# Patient Record
Sex: FEMALE | Race: WHITE | NOT HISPANIC OR LATINO | Employment: UNEMPLOYED | URBAN - METROPOLITAN AREA
[De-identification: names, ages, dates, MRNs, and addresses within clinical notes are randomized per-mention and may not be internally consistent; named-entity substitution may affect disease eponyms.]

---

## 2018-06-14 ENCOUNTER — TREATMENT (OUTPATIENT)
Dept: PHYSICAL THERAPY | Facility: CLINIC | Age: 38
End: 2018-06-14

## 2018-06-14 DIAGNOSIS — M54.5 CHRONIC LOW BACK PAIN, UNSPECIFIED BACK PAIN LATERALITY, WITH SCIATICA PRESENCE UNSPECIFIED: ICD-10-CM

## 2018-06-14 DIAGNOSIS — G89.29 CHRONIC LOW BACK PAIN, UNSPECIFIED BACK PAIN LATERALITY, WITH SCIATICA PRESENCE UNSPECIFIED: ICD-10-CM

## 2018-06-14 DIAGNOSIS — M46.1 SACROILIITIS (HCC): Primary | ICD-10-CM

## 2018-06-14 PROCEDURE — 97161 PT EVAL LOW COMPLEX 20 MIN: CPT | Performed by: PHYSICAL THERAPIST

## 2018-06-14 NOTE — PROGRESS NOTES
Physical Therapy Initial Evaluation and Plan of Care    Patient: Lana Wisdom   : 1980  Diagnosis/ICD-10 Code:  Sacroiliitis [M46.1]  Referring practitioner: No ref. provider found  Date of Initial Visit: 2018  Today's Date: 2018  Patient seen for 1 sessions             Subjective Evaluation    History of Present Illness  Mechanism of injury: Pt states that she has some Si joint dysfunction on the right side that causes spasm in the QL's. She has pain with running, sitting, standing, and she has difficulty with sleeping. She had PRP injections about 2 months ago and she feels like that that helped to resolve her arthritis and decrease some of her symptoms but she still has pain with activity and she feels like she still has pain because of the hypermobility. She also feels like she has some spasm in the psoas muscle and along the transverse abdominus as well. Some pain in the outside and front of the hip. She denies having any pain into the leg. Pt had therapy about 4 years ago but she didn't see much benefit.     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 She feels like the issue with her SI joint may be related to the mortons toe.    Pt works out frequently and is very active. She is running about 9-12 miles per week.        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  Quality of life: good    Pain  Current pain ratin  At best pain ratin  At worst pain rating: 10  Location: SI joint with muscle spasms in the QL's and other associated musculature.   Quality: dull ache and sharp  Alleviating factors: SI belt.  Exacerbated by: running.  Progression: worsening (worsening overall but better since the PRP injections)    Treatments  No previous or current treatments  Previous treatment: chiropractic, injection treatment and medication (PRP injections)  Patient Goals  Patient goals  for therapy: decreased pain, increased strength and return to sport/leisure activities             Objective       Palpation     Additional Palpation Details  TTP along lower lumbar segemtns, right SI joint, right QL, right piriformis, an right glut med.     Hypermobility noted in the lumbar spine an SI joints. Generalized laxity noted as well.     Active Range of Motion     Lumbar   Normal active range of motion    Strength/Myotome Testing     Left Hip   Planes of Motion   Extension: 4  Abduction: 4    Right Hip   Planes of Motion   Extension: 4  Abduction: 4    Tests     Lumbar     Right   Negative passive SLR and quadrant.     Additional Tests Details  Supine leg length test + for post rotation of right innominate          Assessment & Plan     Assessment  Impairments: abnormal muscle tone, abnormal or restricted ROM, activity intolerance, impaired physical strength, lacks appropriate home exercise program and pain with function  Assessment details: Patient is a 37 year old female who comes to physical therapy with c/o chronic SI jt pain and muscle spasms. Signs and symptoms are consistent with global ligamentous laxity with SI joint dysfunction and associated muscle guarding resulting in pain, decreased ROM, decreased strength, and inability to perform all essential functional activities. Pt will benefit from skilled PT services to address the above issues.     Prognosis: good  Prognosis details:   SHORT TERM GOALS:     2 weeks  1. Pt independent with HEP  2. Pt to report being able to run 2 miles without pain  3. Pt to demonstrate bilateral hip strength 4/5 in all planes to improved stability of the core/trunk     LONG TERM GOALS:   6 weeks  1. Pt to report being able to do all workout activities without exacerbation of symptoms  2. Pt to demonstrate ability to perform full functional squat with good form and without increased pain in the low back   3. Pt to report being able to drive up to 2 hours without pain  in the low back     Functional Limitations: carrying objects, lifting, pulling, pushing, uncomfortable because of pain, sitting and unable to perform repetitive tasks  Plan  Therapy options: will be seen for skilled physical therapy services  Planned modality interventions: cryotherapy, high voltage pulsed current (pain management), iontophoresis, microcurrent electrical stimulation, TENS, thermotherapy (hydrocollator packs), ultrasound and traction  Planned therapy interventions: ADL retraining, body mechanics training, flexibility, functional ROM exercises, home exercise program, IADL retraining, joint mobilization, manual therapy, motor coordination training, neuromuscular re-education, postural training, soft tissue mobilization, spinal/joint mobilization, strengthening, stretching and abdominal trunk stabilization  Frequency: 2x week  Duration in weeks: 6  Treatment plan discussed with: patient        Evaluation Only     PT SIGNATURE: Beck Evans PT   DATE TREATMENT INITIATED: 6/14/2018    Initial Certification  Certification Period: 9/12/2018  I certify that the therapy services are furnished while this patient is under my care.  The services outlined above are required by this patient, and will be reviewed every 90 days.     PHYSICIAN:       DATE:     Please sign and return via fax to [phone].. Thank you, [facility].

## 2018-06-18 ENCOUNTER — TRANSCRIBE ORDERS (OUTPATIENT)
Dept: PHYSICAL THERAPY | Facility: CLINIC | Age: 38
End: 2018-06-18

## 2018-06-18 DIAGNOSIS — M53.3 SACROILIAC JOINT PAIN: Primary | ICD-10-CM

## 2018-06-25 ENCOUNTER — TREATMENT (OUTPATIENT)
Dept: PHYSICAL THERAPY | Facility: CLINIC | Age: 38
End: 2018-06-25

## 2018-06-25 DIAGNOSIS — M46.1 SACROILIITIS (HCC): Primary | ICD-10-CM

## 2018-06-25 DIAGNOSIS — M54.5 CHRONIC LOW BACK PAIN, UNSPECIFIED BACK PAIN LATERALITY, WITH SCIATICA PRESENCE UNSPECIFIED: ICD-10-CM

## 2018-06-25 DIAGNOSIS — G89.29 CHRONIC LOW BACK PAIN, UNSPECIFIED BACK PAIN LATERALITY, WITH SCIATICA PRESENCE UNSPECIFIED: ICD-10-CM

## 2018-06-25 PROCEDURE — 97110 THERAPEUTIC EXERCISES: CPT | Performed by: PHYSICAL THERAPIST

## 2018-06-25 PROCEDURE — 97140 MANUAL THERAPY 1/> REGIONS: CPT | Performed by: PHYSICAL THERAPIST

## 2018-06-27 NOTE — PROGRESS NOTES
Physical Therapy Daily Progress Note    Subjective   Lana Wisdom reports that she is feeling about the same, but she has some relief from pain with activation of the TA     Objective   See Exercise, Manual, and Modality Logs for complete treatment.       Assessment/Plan     Dry needling performed for the right QL. Pt seemed to respond well to treatment today and she was able to stretch the QL during treatment and get mild relief of symptoms.     Progress per Plan of Care and Progress strengthening /stabilization /functional activity           Manual Therapy:    32     mins  34981;  Therapeutic Exercise:    28     mins  28223;     Neuromuscular Stacey:        mins  85158;    Therapeutic Activity:          mins  37333;     Gait Training:           mins  83250;     Ultrasound:          mins  50939;    Electrical Stimulation:         mins  04449 ( );  Dry Needling          mins self-pay    Timed Treatment:   60   mins   Total Treatment:     75   mins    Beck Evans PT  Physical Therapist

## 2018-06-29 ENCOUNTER — TREATMENT (OUTPATIENT)
Dept: PHYSICAL THERAPY | Facility: CLINIC | Age: 38
End: 2018-06-29

## 2018-06-29 DIAGNOSIS — M54.5 CHRONIC LOW BACK PAIN, UNSPECIFIED BACK PAIN LATERALITY, WITH SCIATICA PRESENCE UNSPECIFIED: ICD-10-CM

## 2018-06-29 DIAGNOSIS — G89.29 CHRONIC LOW BACK PAIN, UNSPECIFIED BACK PAIN LATERALITY, WITH SCIATICA PRESENCE UNSPECIFIED: ICD-10-CM

## 2018-06-29 DIAGNOSIS — M46.1 SACROILIITIS (HCC): Primary | ICD-10-CM

## 2018-06-29 PROCEDURE — 97110 THERAPEUTIC EXERCISES: CPT | Performed by: PHYSICAL THERAPIST

## 2018-06-29 PROCEDURE — 97140 MANUAL THERAPY 1/> REGIONS: CPT | Performed by: PHYSICAL THERAPIST

## 2018-06-30 NOTE — PROGRESS NOTES
Physical Therapy Daily Progress Note    Subjective   Lana Wisdom reports that she has been feeling much better since the prevous treatment. She did not have any spasm in the right QL this week.     Objective   See Exercise, Manual, and Modality Logs for complete treatment.       Assessment/Plan     Pt is making good progress with therapy and she is reposnding well to dry needling treatment. Will continue to progress as indicated.     Progress per Plan of Care and Progress strengthening /stabilization /functional activity           Manual Therapy:    32     mins  63145;  Therapeutic Exercise:    15     mins  99569;     Neuromuscular Stacey:        mins  93047;    Therapeutic Activity:          mins  52189;     Gait Training:           mins  44072;     Ultrasound:          mins  58021;    Electrical Stimulation:         mins  03041 ( );  Dry Needling          mins self-pay    Timed Treatment:   47   mins   Total Treatment:     60   mins    Beck Evans PT  Physical Therapist

## 2018-07-11 ENCOUNTER — TREATMENT (OUTPATIENT)
Dept: PHYSICAL THERAPY | Facility: CLINIC | Age: 38
End: 2018-07-11

## 2018-07-11 DIAGNOSIS — M54.5 CHRONIC LOW BACK PAIN, UNSPECIFIED BACK PAIN LATERALITY, WITH SCIATICA PRESENCE UNSPECIFIED: ICD-10-CM

## 2018-07-11 DIAGNOSIS — M46.1 SACROILIITIS (HCC): Primary | ICD-10-CM

## 2018-07-11 DIAGNOSIS — G89.29 CHRONIC LOW BACK PAIN, UNSPECIFIED BACK PAIN LATERALITY, WITH SCIATICA PRESENCE UNSPECIFIED: ICD-10-CM

## 2018-07-11 PROCEDURE — 97140 MANUAL THERAPY 1/> REGIONS: CPT | Performed by: PHYSICAL THERAPIST

## 2018-07-11 PROCEDURE — 97032 APPL MODALITY 1+ESTIM EA 15: CPT | Performed by: PHYSICAL THERAPIST

## 2018-07-12 NOTE — PROGRESS NOTES
Physical Therapy Daily Progress Note    Subjective   Lana Wisdom reports that she has been feeling better overall. She gets relief from the spasms with the dry needling, but she feels like driving makes her pain worse.     Objective   See Exercise, Manual, and Modality Logs for complete treatment.       Assessment/Plan     Pt was slightly more tender with dry needling in the clinic today. Will continue to progress with stabilization activities an perform dry needling an appropriate.     Progress per Plan of Care and Progress strengthening /stabilization /functional activity           Manual Therapy:    32     mins  65452;  Therapeutic Exercise:         mins  57038;     Neuromuscular Stacey:        mins  77394;    Therapeutic Activity:          mins  92725;     Gait Training:           mins  67152;     Ultrasound:          mins  89170;    Electrical Stimulation:    20     mins  35140 ( );  Dry Needling          mins self-pay    Timed Treatment:   32   mins   Total Treatment:     58   mins    Beck Evans, PT  Physical Therapist

## 2018-07-16 ENCOUNTER — TREATMENT (OUTPATIENT)
Dept: PHYSICAL THERAPY | Facility: CLINIC | Age: 38
End: 2018-07-16

## 2018-07-16 DIAGNOSIS — M46.1 SACROILIITIS (HCC): Primary | ICD-10-CM

## 2018-07-16 DIAGNOSIS — G89.29 CHRONIC LOW BACK PAIN, UNSPECIFIED BACK PAIN LATERALITY, WITH SCIATICA PRESENCE UNSPECIFIED: ICD-10-CM

## 2018-07-16 DIAGNOSIS — M54.5 CHRONIC LOW BACK PAIN, UNSPECIFIED BACK PAIN LATERALITY, WITH SCIATICA PRESENCE UNSPECIFIED: ICD-10-CM

## 2018-07-16 PROCEDURE — 97110 THERAPEUTIC EXERCISES: CPT | Performed by: PHYSICAL THERAPIST

## 2018-07-16 PROCEDURE — 97140 MANUAL THERAPY 1/> REGIONS: CPT | Performed by: PHYSICAL THERAPIST

## 2018-07-18 ENCOUNTER — TREATMENT (OUTPATIENT)
Dept: PHYSICAL THERAPY | Facility: CLINIC | Age: 38
End: 2018-07-18

## 2018-07-18 DIAGNOSIS — M54.5 CHRONIC LOW BACK PAIN, UNSPECIFIED BACK PAIN LATERALITY, WITH SCIATICA PRESENCE UNSPECIFIED: ICD-10-CM

## 2018-07-18 DIAGNOSIS — G89.29 CHRONIC LOW BACK PAIN, UNSPECIFIED BACK PAIN LATERALITY, WITH SCIATICA PRESENCE UNSPECIFIED: ICD-10-CM

## 2018-07-18 DIAGNOSIS — M46.1 SACROILIITIS (HCC): Primary | ICD-10-CM

## 2018-07-18 PROCEDURE — 97032 APPL MODALITY 1+ESTIM EA 15: CPT | Performed by: PHYSICAL THERAPIST

## 2018-07-18 PROCEDURE — 97140 MANUAL THERAPY 1/> REGIONS: CPT | Performed by: PHYSICAL THERAPIST

## 2018-07-20 ENCOUNTER — TREATMENT (OUTPATIENT)
Dept: PHYSICAL THERAPY | Facility: CLINIC | Age: 38
End: 2018-07-20

## 2018-07-20 DIAGNOSIS — M46.1 SACROILIITIS (HCC): Primary | ICD-10-CM

## 2018-07-20 DIAGNOSIS — M54.5 CHRONIC LOW BACK PAIN, UNSPECIFIED BACK PAIN LATERALITY, WITH SCIATICA PRESENCE UNSPECIFIED: ICD-10-CM

## 2018-07-20 DIAGNOSIS — G89.29 CHRONIC LOW BACK PAIN, UNSPECIFIED BACK PAIN LATERALITY, WITH SCIATICA PRESENCE UNSPECIFIED: ICD-10-CM

## 2018-07-20 PROCEDURE — 97110 THERAPEUTIC EXERCISES: CPT | Performed by: PHYSICAL THERAPIST

## 2018-07-20 PROCEDURE — 97140 MANUAL THERAPY 1/> REGIONS: CPT | Performed by: PHYSICAL THERAPIST

## 2018-07-20 NOTE — PROGRESS NOTES
Physical Therapy Daily Progress Note    Subjective   Lana Wsidom reports that she is feeling much better, she drove to [place] and back yesterday and had some soreness and discomfort but she is not having much pain today, only tightness.     Objective   See Exercise, Manual, and Modality Logs for complete treatment.       Assessment/Plan     Hypertonciity noted in the right lumbar paraspinals and moderate muscle spasm noted with dry needling today. Overall pt seems to be responding very well to treatment, Will continue to progress as tolerated.     Progress per Plan of Care and Progress strengthening /stabilization /functional activity           Manual Therapy:    25     mins  64463;  Therapeutic Exercise:         mins  34119;     Neuromuscular Stacey:        mins  95610;    Therapeutic Activity:          mins  21853;     Gait Training:           mins  15271;     Ultrasound:          mins  54278;    Electrical Stimulation:    20     mins  05823 ( );  Dry Needling          mins self-pay    Timed Treatment:   25   mins   Total Treatment:     58   mins    Beck Evans, PT  Physical Therapist

## 2018-07-23 NOTE — PROGRESS NOTES
Physical Therapy Daily Progress Note    Subjective   Lana Wisdom reports that she has been doing better and seems to have less overall pain in the low back and hip. She is having more pain in the hip today     Objective   See Exercise, Manual, and Modality Logs for complete treatment.       Assessment/Plan     Pt responded well to therapy. She fatigued quickly and had soreness with hip strengthening exercises.     Progress per Plan of Care and Progress strengthening /stabilization /functional activity           Manual Therapy:    14     mins  40018;  Therapeutic Exercise:    40     mins  71761;     Neuromuscular Stacey:        mins  91879;    Therapeutic Activity:          mins  06102;     Gait Training:           mins  01794;     Ultrasound:          mins  75475;    Electrical Stimulation:         mins  69693 ( );  Dry Needling          mins self-pay    Timed Treatment:   54   mins   Total Treatment:     70   mins    Beck Evans, PT  Physical Therapist

## 2018-07-25 ENCOUNTER — TREATMENT (OUTPATIENT)
Dept: PHYSICAL THERAPY | Facility: CLINIC | Age: 38
End: 2018-07-25

## 2018-07-25 DIAGNOSIS — M46.1 SACROILIITIS (HCC): Primary | ICD-10-CM

## 2018-07-25 DIAGNOSIS — M54.5 CHRONIC LOW BACK PAIN, UNSPECIFIED BACK PAIN LATERALITY, WITH SCIATICA PRESENCE UNSPECIFIED: ICD-10-CM

## 2018-07-25 DIAGNOSIS — G89.29 CHRONIC LOW BACK PAIN, UNSPECIFIED BACK PAIN LATERALITY, WITH SCIATICA PRESENCE UNSPECIFIED: ICD-10-CM

## 2018-07-25 PROCEDURE — 97110 THERAPEUTIC EXERCISES: CPT | Performed by: PHYSICAL THERAPIST

## 2018-07-26 NOTE — PROGRESS NOTES
Physical Therapy Daily Progress Note    Subjective   Lana Wisdom reports that she feels like she is doing better. She has less pain with all activity and she is able to decrease her pain when driving if she concentrates on activating her TA and pelvic floor     Objective   See Exercise, Manual, and Modality Logs for complete treatment.       Assessment/Plan     Pt is progressing well overall with therapy and she has less pain with all activities. She had a little more discomfort with the dry needling today and slightly more muscle spasm with needling at the lateral aspect of the QL and at the L3 paraspinals. Pt only able to stay for a short amount of time today due to having an another appointment.    Progress per Plan of Care and Progress strengthening /stabilization /functional activity           Manual Therapy:    32     mins  68416;  Therapeutic Exercise:         mins  33214;     Neuromuscular Stacey:        mins  72673;    Therapeutic Activity:          mins  66772;     Gait Training:           mins  56915;     Ultrasound:          mins  27636;    Electrical Stimulation:         mins  75208 ( );  Dry Needling          mins self-pay    Timed Treatment:   32   mins   Total Treatment:     48   mins    Beck Evans PT  Physical Therapist

## 2018-08-01 ENCOUNTER — TREATMENT (OUTPATIENT)
Dept: PHYSICAL THERAPY | Facility: CLINIC | Age: 38
End: 2018-08-01

## 2018-08-01 DIAGNOSIS — G89.29 CHRONIC LOW BACK PAIN, UNSPECIFIED BACK PAIN LATERALITY, WITH SCIATICA PRESENCE UNSPECIFIED: ICD-10-CM

## 2018-08-01 DIAGNOSIS — M46.1 SACROILIITIS (HCC): Primary | ICD-10-CM

## 2018-08-01 DIAGNOSIS — M54.5 CHRONIC LOW BACK PAIN, UNSPECIFIED BACK PAIN LATERALITY, WITH SCIATICA PRESENCE UNSPECIFIED: ICD-10-CM

## 2018-08-01 PROCEDURE — 97140 MANUAL THERAPY 1/> REGIONS: CPT | Performed by: PHYSICAL THERAPIST

## 2018-08-01 PROCEDURE — 97032 APPL MODALITY 1+ESTIM EA 15: CPT | Performed by: PHYSICAL THERAPIST

## 2018-08-01 NOTE — PROGRESS NOTES
Re-Assessment / Re-Certification      Patient: Lana Wisdom   : 1980  Diagnosis/ICD-10 Code:  Sacroiliitis (CMS/[facility]) [M46.1]  Referring practitioner: Siri Post,*  Date of Initial Visit: 2018  Today's Date: 2018  Patient seen for 9 sessions      Subjective:   Lana Wisdom reports that since beginning therapy she is feeling much better. She was able to take a very long trip last with with around 11 hours of driving and she did not have any excessive pain and was able to control her symptoms with activation of the hip abductors and ER while using a band around the knees when driving. She is also able to work out at this time without increasing pain in the low back and hip. Overall she is having much less pain and muscle spasm in the right lumbar spine and right hip flexor. She feels like the dry needling and exercise to improve core and pelvic stability has been especially helpful.     Clinical Progress: improved  Home Program Compliance: Yes  Treatment has included: therapeutic exercise, neuromuscular re-education, manual therapy, electrical stimulation and dry needling    Subjective   Objective       Palpation     Additional Palpation Details  Mild tenderness noted with deep palpation along the lateral border of the right QL and at the right SI joint.     No tenderness noted in the right hip flexor or psoas muscle at the time of reassessment    Active Range of Motion     Lumbar   Normal active range of motion    Strength/Myotome Testing     Left Hip   Planes of Motion   Extension: 4+  Abduction: 4+    Right Hip   Planes of Motion   Extension: 4+  Abduction: 4+    Tests     Additional Tests Details  Supine leg length test negative for pelvic obliquity     Assessment/Plan  Progress toward previous goals: Partially Met    New Goals  No new goals at this time       Recommendations: Continue as planned - dry needling as needed with progression of exercise as indicated   Timeframe: 1  month  Prognosis to achieve goals: good    PT Signature: Beck Evans PT      Based upon review of the patient's progress and continued therapy plan, it is my medical opinion that Lana Wisdom should continue physical therapy treatment at [facility]  [address] [place] [id number] [phone].    Signature: __________________________________  Siri Post MD    Manual Therapy:    18     mins  13616;  Therapeutic Exercise:         mins  85768;     Neuromuscular Stacey:        mins  49855;    Therapeutic Activity:          mins  58786;     Gait Training:           mins  75110;     Ultrasound:          mins  07207;    Electrical Stimulation:    20     mins  90219 ( );  Dry Needling          mins self-pay    Timed Treatment:   18   mins   Total Treatment:     50   mins

## 2018-09-11 ENCOUNTER — TRANSCRIBE ORDERS (OUTPATIENT)
Dept: PHYSICAL THERAPY | Facility: CLINIC | Age: 38
End: 2018-09-11

## 2018-09-11 ENCOUNTER — TREATMENT (OUTPATIENT)
Dept: PHYSICAL THERAPY | Facility: CLINIC | Age: 38
End: 2018-09-11

## 2018-09-11 DIAGNOSIS — M46.1 SACROILIITIS (HCC): Primary | ICD-10-CM

## 2018-09-11 DIAGNOSIS — M54.5 CHRONIC LOW BACK PAIN, UNSPECIFIED BACK PAIN LATERALITY, WITH SCIATICA PRESENCE UNSPECIFIED: ICD-10-CM

## 2018-09-11 DIAGNOSIS — G89.29 CHRONIC LOW BACK PAIN, UNSPECIFIED BACK PAIN LATERALITY, WITH SCIATICA PRESENCE UNSPECIFIED: ICD-10-CM

## 2018-09-11 DIAGNOSIS — M53.3 SI (SACROILIAC) JOINT DYSFUNCTION: Primary | ICD-10-CM

## 2018-09-11 PROCEDURE — 97161 PT EVAL LOW COMPLEX 20 MIN: CPT | Performed by: PHYSICAL THERAPIST

## 2018-09-12 NOTE — PROGRESS NOTES
Re-Assessment / Re-Certification      Patient: Lana Wisdom   : 1980  Diagnosis/ICD-10 Code:  Sacroiliitis (CMS/HCC) [M46.1]  Referring practitioner: Siri Post,*  Date of Initial Visit: 2018  Today's Date: 2018  Patient seen for 10 sessions      Subjective:   Lana Wisdom reports that she is feeling better than she did at her initial visit last time and she has noticed consistent relief from her symptoms but she is still now occasionally having some tightness/spasm in the right QL and possibly in the right oblique. Pt continues to have more discomfort and spasm when sitting for a prolonged period of time. Pt reports her pain level as 2/10 in the right side of the low back.     Clinical Progress: improved  Home Program Compliance: Yes  Treatment has included: therapeutic exercise, neuromuscular re-education, manual therapy, electrical stimulation and dry needling    Subjective   Objective       Palpation     Additional Palpation Details  Hypertonicity and pain noted with palpation along the lateral border of the right QL muscle, right QL vertebral attachments, and iliac attachments     Tenderness noted at the right SI joint as well     Active Range of Motion     Lumbar   Flexion: Active lumbar flexion: 75%   Extension: Active lumbar extension: 100%   Left lateral flexion: Active left lumbar lateral flexion: 75%   Right lateral flexion: Active right lumbar lateral flexion: 100%     Strength/Myotome Testing     Left Hip   Planes of Motion   Extension: 4+  Abduction: 4+    Right Hip   Planes of Motion   Extension: 4+  Abduction: 4+    Tests     Additional Tests Details  Supine leg length test negative for pelvic obliquity      Assessment/Plan  Progress toward previous goals: Partially Met     Pt progressed well with initial treatment and she has improved her hip/core strength and stability with continuance of her HEP. Pt continues to have some issues with muscle spasm and  guarding around the low back and right hip. Will continue with PT treatment to address aforementioned issues.     New Goals  Long-term goals (LTG):   4.) Pt to report being able to sit in the car for greater than 3 hours at a time without increase in pain in the low back due to muscle spasm.       Recommendations: Continue as planned  Timeframe: 1 month  Prognosis to achieve goals: good    PT Signature: Beck Evans, PT      Based upon review of the patient's progress and continued therapy plan, it is my medical opinion that Lana Wisdom should continue physical therapy treatment at [facility]  [address], [address]  [facility] [address] [phone].    Signature: __________________________________  Siri Post MD    Evaluation Only

## 2018-10-12 ENCOUNTER — TREATMENT (OUTPATIENT)
Dept: PHYSICAL THERAPY | Facility: CLINIC | Age: 38
End: 2018-10-12

## 2018-10-12 DIAGNOSIS — M46.1 SACROILIITIS (HCC): Primary | ICD-10-CM

## 2018-10-12 DIAGNOSIS — M54.5 CHRONIC LOW BACK PAIN, UNSPECIFIED BACK PAIN LATERALITY, WITH SCIATICA PRESENCE UNSPECIFIED: ICD-10-CM

## 2018-10-12 DIAGNOSIS — G89.29 CHRONIC LOW BACK PAIN, UNSPECIFIED BACK PAIN LATERALITY, WITH SCIATICA PRESENCE UNSPECIFIED: ICD-10-CM

## 2018-10-12 PROCEDURE — 97140 MANUAL THERAPY 1/> REGIONS: CPT | Performed by: PHYSICAL THERAPIST

## 2018-10-15 ENCOUNTER — TREATMENT (OUTPATIENT)
Dept: PHYSICAL THERAPY | Facility: CLINIC | Age: 38
End: 2018-10-15

## 2018-10-15 DIAGNOSIS — G89.29 CHRONIC LOW BACK PAIN, UNSPECIFIED BACK PAIN LATERALITY, WITH SCIATICA PRESENCE UNSPECIFIED: ICD-10-CM

## 2018-10-15 DIAGNOSIS — M46.1 SACROILIITIS (HCC): Primary | ICD-10-CM

## 2018-10-15 DIAGNOSIS — M54.5 CHRONIC LOW BACK PAIN, UNSPECIFIED BACK PAIN LATERALITY, WITH SCIATICA PRESENCE UNSPECIFIED: ICD-10-CM

## 2018-10-15 PROCEDURE — 97032 APPL MODALITY 1+ESTIM EA 15: CPT | Performed by: PHYSICAL THERAPIST

## 2018-10-15 PROCEDURE — 97140 MANUAL THERAPY 1/> REGIONS: CPT | Performed by: PHYSICAL THERAPIST

## 2018-10-15 NOTE — PROGRESS NOTES
Physical Therapy Daily Progress Note    Subjective   Lana Wisdom reports that she has had some increase in pain in the low back recently and over the past 1.5-2 weeks she has had pain in the right ankle and right knee. She feels like this may be due to highter intensity workouts and taking out her orthotics       Objective   See Exercise, Manual, and Modality Logs for complete treatment.       Assessment/Plan     Pt's s/s seem to be consistent with right lateral ankle sprain and mild-moderate PFD. In addition to the new issues she also presented with a significant anterior rotation of the right innominate   Progress per Plan of Care and Progress strengthening /stabilization /functional activity           Manual Therapy:    40     mins  58181;  Therapeutic Exercise:         mins  30418;     Neuromuscular Stacey:        mins  85783;    Therapeutic Activity:          mins  03233;     Gait Training:           mins  60244;     Ultrasound:          mins  07300;    Electrical Stimulation:         mins  75321 ( );  Iontophoresis          mins 20321   Traction          mins  67444  Fluidotherapy          mins  55922  Dry Needling          mins self-pay  Paraffin          mins  87737    Timed Treatment:   40   mins   Total Treatment:     40   mins    Beck Evans, PT, DPT, OCS, Cert. DN  Physical Therapist

## 2018-10-17 NOTE — PROGRESS NOTES
Physical Therapy Daily Progress Note    Subjective   Lana Wisdom reports that she feels better since her last visit, she has less pain in the right knee and the pain in the right ankle is decreased as well. Pt continues to have issues with pain in the right shoulder blade     Today's Pain rating: 3/10    Objective   See Exercise, Manual, and Modality Logs for complete treatment.     Dry needling shoulder and scapular protocol performed today     Assessment/Plan     Pt has responded well to manual therapy so far in the clinic and she reports less pain overall after correction of pelvic obliquity and manipulation of the right ankle. Will assess her response to dry needling.     Progress per Plan of Care and Progress strengthening /stabilization /functional activity           Manual Therapy:    28     mins  21575;  Therapeutic Exercise:         mins  23604;     Neuromuscular Stacey:        mins  93871;    Therapeutic Activity:          mins  88893;     Gait Training:           mins  85988;     Ultrasound:          mins  08889;    Electrical Stimulation:    20     mins  64898 ( );  Iontophoresis          mins 30399   Traction          mins  56859  Fluidotherapy          mins  68302  Dry Needling          mins self-pay  Paraffin          mins  27838    Timed Treatment:   28   mins   Total Treatment:     48   mins    Beck Evans, PT, DPT, OCS, Cert. DN  Physical Therapist

## 2018-10-26 ENCOUNTER — TREATMENT (OUTPATIENT)
Dept: PHYSICAL THERAPY | Facility: CLINIC | Age: 38
End: 2018-10-26

## 2018-10-26 DIAGNOSIS — G89.29 CHRONIC LOW BACK PAIN, UNSPECIFIED BACK PAIN LATERALITY, WITH SCIATICA PRESENCE UNSPECIFIED: ICD-10-CM

## 2018-10-26 DIAGNOSIS — M46.1 SACROILIITIS (HCC): Primary | ICD-10-CM

## 2018-10-26 DIAGNOSIS — M54.5 CHRONIC LOW BACK PAIN, UNSPECIFIED BACK PAIN LATERALITY, WITH SCIATICA PRESENCE UNSPECIFIED: ICD-10-CM

## 2018-10-26 PROCEDURE — 97140 MANUAL THERAPY 1/> REGIONS: CPT | Performed by: PHYSICAL THERAPIST

## 2018-10-29 NOTE — PROGRESS NOTES
Physical Therapy Daily Progress Note    Subjective   Lana Wisdom reports that she has been feeling much better. She started wearing the orthotics in her shoes again and it seems to have helped her ankle. She is mostly just having pain in the shoulder blade.       Objective   See Exercise, Manual, and Modality Logs for complete treatment.       Assessment/Plan     Pt is responidng very well to treatment thus far and she has been able to tolerate driving long distances without back pain and is able to do more working out without exacerbation of symptoms.     Progress per Plan of Care and Progress strengthening /stabilization /functional activity           Manual Therapy:    25     mins  36121;  Therapeutic Exercise:         mins  54744;     Neuromuscular Stacey:        mins  94155;    Therapeutic Activity:          mins  81134;     Gait Training:           mins  32563;     Ultrasound:          mins  05621;    Electrical Stimulation:         mins  88754 ( );  Iontophoresis          mins 14312   Traction          mins  65032  Fluidotherapy          mins  62104  Dry Needling          mins self-pay  Paraffin          mins  72277    Timed Treatment:   25   mins   Total Treatment:     25   mins    Beck Evans, PT, DPT, OCS, Cert. DN  Physical Therapist

## 2019-11-04 ENCOUNTER — TREATMENT (OUTPATIENT)
Dept: PHYSICAL THERAPY | Facility: CLINIC | Age: 39
End: 2019-11-04

## 2019-11-04 DIAGNOSIS — G89.29 CHRONIC LOW BACK PAIN, UNSPECIFIED BACK PAIN LATERALITY, UNSPECIFIED WHETHER SCIATICA PRESENT: ICD-10-CM

## 2019-11-04 DIAGNOSIS — M46.1 SACROILIITIS (HCC): Primary | ICD-10-CM

## 2019-11-04 DIAGNOSIS — M54.50 CHRONIC LOW BACK PAIN, UNSPECIFIED BACK PAIN LATERALITY, UNSPECIFIED WHETHER SCIATICA PRESENT: ICD-10-CM

## 2019-11-04 PROCEDURE — DRYNDL PR CUSTOM DRY NEEDLING SELF PAY: Performed by: PHYSICAL THERAPIST

## 2019-11-05 NOTE — PROGRESS NOTES
Physical Therapy Daily Progress Note    Subjective   Lana Wisdom reports that she has had a recurrence of pain in the low back, mostly on the right side of the low back and radiating around to the flank. Pt had an SI joint injection which seems to have significantly helped her SIJ pain.       Objective   See Exercise, Manual, and Modality Logs for complete treatment.     Dry needling performed at the right QL, right lumbar paraspinals, right gliut med, and right piriformis     Assessment/Plan     Dry needling performed today in the clinic with the patient having an overall good response to treatment and reporing feeling more loose in the low back. Will assess her overall response and progress as tolerated.     Progress per Plan of Care           Dry needling self pay treatment     Beck Evans, PT, DPT, OCS, Cert. DN  Physical Therapist

## 2019-11-07 ENCOUNTER — TREATMENT (OUTPATIENT)
Dept: PHYSICAL THERAPY | Facility: CLINIC | Age: 39
End: 2019-11-07

## 2019-11-07 DIAGNOSIS — M54.50 CHRONIC LOW BACK PAIN, UNSPECIFIED BACK PAIN LATERALITY, UNSPECIFIED WHETHER SCIATICA PRESENT: ICD-10-CM

## 2019-11-07 DIAGNOSIS — G89.29 CHRONIC LOW BACK PAIN, UNSPECIFIED BACK PAIN LATERALITY, UNSPECIFIED WHETHER SCIATICA PRESENT: ICD-10-CM

## 2019-11-07 DIAGNOSIS — M46.1 SACROILIITIS (HCC): Primary | ICD-10-CM

## 2019-11-07 PROCEDURE — DRYNDL PR CUSTOM DRY NEEDLING SELF PAY: Performed by: PHYSICAL THERAPIST

## 2019-11-08 NOTE — PROGRESS NOTES
Physical Therapy Daily Progress Note    Subjective   Lana Wisdom reports that she felt a little more loose the day after and for a couple of days after her last treatment. She did have some soreness in the low back after the needling for the rest of the day though.       Objective   See Exercise, Manual, and Modality Logs for complete treatment.       Assessment/Plan     Dry needling performed in the right side of the low back at the lumbar paraspinals and right posterior iliac crest. Pt tolerated treatment well.     Progress per Plan of Care and Progress strengthening /stabilization /functional activity           Manual Therapy:         mins  10814;  Therapeutic Exercise:         mins  23920;     Neuromuscular Stacey:        mins  26536;    Therapeutic Activity:          mins  24696;     Gait Training:           mins  60874;     Ultrasound:          mins  47794;    Electrical Stimulation:         mins  89601 ( );  Iontophoresis          mins 48236   Traction          mins  56721  Fluidotherapy          mins  32460  Dry Needling     35     mins self-pay  Paraffin          mins  83444    Timed Treatment:      mins   Total Treatment:        mins    Beck Evans, PT, DPT, OCS, Cert. DN  Physical Therapist

## 2019-11-12 ENCOUNTER — TREATMENT (OUTPATIENT)
Dept: PHYSICAL THERAPY | Facility: CLINIC | Age: 39
End: 2019-11-12

## 2019-11-12 DIAGNOSIS — G89.29 CHRONIC LOW BACK PAIN, UNSPECIFIED BACK PAIN LATERALITY, UNSPECIFIED WHETHER SCIATICA PRESENT: ICD-10-CM

## 2019-11-12 DIAGNOSIS — M54.50 CHRONIC LOW BACK PAIN, UNSPECIFIED BACK PAIN LATERALITY, UNSPECIFIED WHETHER SCIATICA PRESENT: ICD-10-CM

## 2019-11-12 DIAGNOSIS — M46.1 SACROILIITIS (HCC): Primary | ICD-10-CM

## 2019-11-12 PROCEDURE — DRYNDL PR CUSTOM DRY NEEDLING SELF PAY: Performed by: PHYSICAL THERAPIST

## 2019-11-14 NOTE — PROGRESS NOTES
Physical Therapy Daily Progress Note    Subjective   Lana Wisdom reports that she feels like the needling at the previous visit was very helpful and decreased her overall pain level.       Objective   See Exercise, Manual, and Modality Logs for complete treatment.       Assessment/Plan     Focused dry needling at the right L5 transverse process, paraspinals, and QL again today. Pt seems to be responding well to treatment.     Progress per Plan of Care           Manual Therapy:         mins  37568;  Therapeutic Exercise:         mins  59420;     Neuromuscular Stacey:        mins  34089;    Therapeutic Activity:          mins  32094;     Gait Training:           mins  00263;     Ultrasound:         mins  00037;    Electrical Stimulation:         mins  75675 ( );  Iontophoresis          mins 48433   Traction          mins  36837  Fluidotherapy          mins  50237  Dry Needling          mins self-pay  Paraffin     35     mins  21073    Timed Treatment:   35   mins   Total Treatment:     35   mins    Beck Evans, PT, DPT, OCS, Cert. DN  Physical Therapist

## 2019-11-15 ENCOUNTER — TREATMENT (OUTPATIENT)
Dept: PHYSICAL THERAPY | Facility: CLINIC | Age: 39
End: 2019-11-15

## 2019-11-15 DIAGNOSIS — M46.1 SACROILIITIS (HCC): Primary | ICD-10-CM

## 2019-11-15 DIAGNOSIS — G89.29 CHRONIC LOW BACK PAIN, UNSPECIFIED BACK PAIN LATERALITY, UNSPECIFIED WHETHER SCIATICA PRESENT: ICD-10-CM

## 2019-11-15 DIAGNOSIS — M54.50 CHRONIC LOW BACK PAIN, UNSPECIFIED BACK PAIN LATERALITY, UNSPECIFIED WHETHER SCIATICA PRESENT: ICD-10-CM

## 2019-11-15 PROCEDURE — DRYNDL PR CUSTOM DRY NEEDLING SELF PAY: Performed by: PHYSICAL THERAPIST

## 2019-11-18 NOTE — PROGRESS NOTES
Physical Therapy Daily Progress Note    Subjective   Lana Wisdom reports that she is feeling better overall in the low back, but she has been having some soreness on the right flank in the obliques       Objective   See Exercise, Manual, and Modality Logs for complete treatment.       Assessment/Plan     Dry needling performed at the right obliques, TFL, and glut med. Pt tolerated treatment well and she is able to perform some stabilization activities in the clinic without exacerbation of symptoms.     Progress per Plan of Care and Progress strengthening /stabilization /functional activity           Manual Therapy:         mins  45103;  Therapeutic Exercise:         mins  73207;     Neuromuscular Stacey:        mins  22789;    Therapeutic Activity:          mins  49180;     Gait Training:           mins  84662;     Ultrasound:          mins  16518;    Electrical Stimulation:         mins  72965 ( );  Iontophoresis          mins 07672   Traction          mins  16960  Fluidotherapy          mins  64656  Dry Needling     30     mins self-pay  Paraffin          mins  35841    Timed Treatment:   30   mins   Total Treatment:     30   mins    Beck Evans, PT, DPT, OCS, Cert. DN  Physical Therapist

## 2019-11-22 ENCOUNTER — TREATMENT (OUTPATIENT)
Dept: PHYSICAL THERAPY | Facility: CLINIC | Age: 39
End: 2019-11-22

## 2019-11-22 DIAGNOSIS — M46.1 SACROILIITIS (HCC): Primary | ICD-10-CM

## 2019-11-22 DIAGNOSIS — M54.50 CHRONIC LOW BACK PAIN, UNSPECIFIED BACK PAIN LATERALITY, UNSPECIFIED WHETHER SCIATICA PRESENT: ICD-10-CM

## 2019-11-22 DIAGNOSIS — G89.29 CHRONIC LOW BACK PAIN, UNSPECIFIED BACK PAIN LATERALITY, UNSPECIFIED WHETHER SCIATICA PRESENT: ICD-10-CM

## 2019-11-22 PROCEDURE — DRYNDL PR CUSTOM DRY NEEDLING SELF PAY: Performed by: PHYSICAL THERAPIST

## 2020-01-08 ENCOUNTER — TRANSCRIBE ORDERS (OUTPATIENT)
Dept: PHYSICAL THERAPY | Facility: CLINIC | Age: 40
End: 2020-01-08

## 2020-01-08 ENCOUNTER — TREATMENT (OUTPATIENT)
Dept: PHYSICAL THERAPY | Facility: CLINIC | Age: 40
End: 2020-01-08

## 2020-01-08 DIAGNOSIS — M54.50 CHRONIC LOW BACK PAIN, UNSPECIFIED BACK PAIN LATERALITY, UNSPECIFIED WHETHER SCIATICA PRESENT: ICD-10-CM

## 2020-01-08 DIAGNOSIS — M46.1 SACROILIITIS (HCC): Primary | ICD-10-CM

## 2020-01-08 DIAGNOSIS — G89.29 CHRONIC LOW BACK PAIN, UNSPECIFIED BACK PAIN LATERALITY, UNSPECIFIED WHETHER SCIATICA PRESENT: ICD-10-CM

## 2020-01-08 DIAGNOSIS — M54.50 LOW BACK PAIN, UNSPECIFIED BACK PAIN LATERALITY, UNSPECIFIED CHRONICITY, UNSPECIFIED WHETHER SCIATICA PRESENT: ICD-10-CM

## 2020-01-08 DIAGNOSIS — M53.3 SACROILIAC JOINT DYSFUNCTION: Primary | ICD-10-CM

## 2020-01-08 PROCEDURE — 97162 PT EVAL MOD COMPLEX 30 MIN: CPT | Performed by: PHYSICAL THERAPIST

## 2020-01-09 NOTE — PROGRESS NOTES
Physical Therapy Initial Evaluation and Plan of Care    Patient: Lana Wisdom   : 1980  Diagnosis/ICD-10 Code:  Sacroiliitis (CMS/[facility]) [M46.1]  Referring practitioner: Ivet Torre MD  Date of Initial Visit: 2020  Today's Date: 2020  Patient seen for 6 sessions             Subjective Evaluation    History of Present Illness  Mechanism of injury: Pt has been seen by myself for this issue before. She is coming back to therapy because of some exacerbaion of symptoms and to resume treatment.     She has been working on getting back in the gym more, and she thinks that this may be part of the reason that she has had a little more pain recently.       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  Quality of life: good    Pain  Current pain rating: 3  At best pain ratin  At worst pain ratin  Location: right side of the lumbar spine, right SI   Quality: sharp and dull ache  Relieving factors: change in position  Aggravating factors: sleeping and prolonged positioning  Progression: worsening    Diagnostic Tests  No diagnostic tests performed    Treatments  Previous treatment: physical therapy  Patient Goals  Patient goals for therapy: decreased pain, increased strength and return to sport/leisure activities             Objective       Palpation     Additional Palpation Details  TTP noted about the right side of the lumbar spine, dia at the transverse processes of L4/5. Hypertonicity and tenderness at the right QL, glut med, and piriformis    Active Range of Motion     Lumbar   Flexion: WFL  Extension: WFL  Left lateral flexion: WFL  Right lateral flexion: WFL    Strength/Myotome Testing     Left Hip   Planes of Motion   Flexion: 5  Extension: 4+  Abduction: 4+    Right Hip   Planes of Motion   Flexion: 5  Extension: 4+  Abduction: 4+    Tests     Lumbar     Left   Negative passive SLR.     Right   Negative passive SLR.     Right Pelvic Girdle/Sacrum    Positive: sacral spring.     Additional Tests Details  Supine leg length test + for posterior rotation of the right innominate          Assessment & Plan     Assessment  Impairments: abnormal muscle tone, abnormal or restricted ROM, activity intolerance, impaired physical strength, lacks appropriate home exercise program and pain with function  Assessment details: Patient is a 39 year old female who comes to physical therapy with c/o pain in the low back and right hip. Signs and symptoms are consistent with right sides SI joint dysfunction and generalized instability with associated muscle guarding resulting in pain, decreased ROM, decreased strength, and inability to perform all essential functional activities. Pt will benefit from skilled PT services to address the above issues.     Prognosis details:   SHORT TERM GOALS:     2 weeks  1. Pt independent with HEP  2. Pt to demonstrate trunk AROM 50-75% of expected norms to allow for improved ability to perform ADL's  3. Pt to demonstrate bilateral hip strength 4/5 in all planes to improved stability of the core/trunk     LONG TERM GOALS:   6 weeks  1. Pt to demonstrate trunk AROM % of expected norms to allow for improved ability to perform functional activities  2. Pt to demonstrate ability to perform full functional squat with good form and without increased pain in the low back   3. Pt to report being able to work full shift or work in the home without increase in pain in the back    Functional Limitations: carrying objects, lifting, pulling, pushing, uncomfortable because of pain, sitting and unable to perform repetitive tasks  Plan  Therapy options: will be seen for skilled physical therapy services  Planned modality interventions: cryotherapy, high voltage pulsed current (pain management), iontophoresis, microcurrent electrical stimulation, TENS, thermotherapy (hydrocollator packs), ultrasound and traction  Planned therapy interventions: ADL retraining,  body mechanics training, flexibility, functional ROM exercises, home exercise program, IADL retraining, joint mobilization, manual therapy, motor coordination training, neuromuscular re-education, postural training, soft tissue mobilization, spinal/joint mobilization, strengthening, stretching and abdominal trunk stabilization  Frequency: 2x week  Duration in weeks: 6  Treatment plan discussed with: patient        Evaluation Only     PT SIGNATURE: Beck Evans, PT, DPT, OCS, Cert. DN   DATE TREATMENT INITIATED: 1/9/2020    Initial Certification  Certification Period: 4/8/2020  I certify that the therapy services are furnished while this patient is under my care.  The services outlined above are required by this patient, and will be reviewed every 90 days.     PHYSICIAN: Ivet Torre MD      DATE:     Please sign and return via fax to [phone].. Thank you, [facility].
